# Patient Record
Sex: MALE | Race: ASIAN | Employment: FULL TIME
[De-identification: names, ages, dates, MRNs, and addresses within clinical notes are randomized per-mention and may not be internally consistent; named-entity substitution may affect disease eponyms.]

---

## 2022-12-08 ENCOUNTER — OFFICE VISIT (OUTPATIENT)
Dept: INTERNAL MEDICINE CLINIC | Facility: CLINIC | Age: 22
End: 2022-12-08
Payer: COMMERCIAL

## 2022-12-08 VITALS
SYSTOLIC BLOOD PRESSURE: 120 MMHG | HEART RATE: 76 BPM | BODY MASS INDEX: 20.61 KG/M2 | HEIGHT: 68 IN | DIASTOLIC BLOOD PRESSURE: 88 MMHG | WEIGHT: 136 LBS | OXYGEN SATURATION: 98 %

## 2022-12-08 DIAGNOSIS — M79.18 MYOFASCIAL MUSCLE PAIN: ICD-10-CM

## 2022-12-08 DIAGNOSIS — M54.9 UPPER BACK PAIN ON LEFT SIDE: ICD-10-CM

## 2022-12-08 DIAGNOSIS — Z00.00 WELLNESS EXAMINATION: Primary | ICD-10-CM

## 2022-12-08 DIAGNOSIS — Z00.00 WELLNESS EXAMINATION: ICD-10-CM

## 2022-12-08 LAB
BASOPHILS # BLD: 0.1 K/UL (ref 0–0.2)
BASOPHILS NFR BLD: 1 % (ref 0–2)
DIFFERENTIAL METHOD BLD: ABNORMAL
EOSINOPHIL # BLD: 0.1 K/UL (ref 0–0.8)
EOSINOPHIL NFR BLD: 3 % (ref 0.5–7.8)
ERYTHROCYTE [DISTWIDTH] IN BLOOD BY AUTOMATED COUNT: 12.2 % (ref 11.9–14.6)
HCT VFR BLD AUTO: 53.6 % (ref 41.1–50.3)
HGB BLD-MCNC: 17.4 G/DL (ref 13.6–17.2)
IMM GRANULOCYTES # BLD AUTO: 0 K/UL (ref 0–0.5)
IMM GRANULOCYTES NFR BLD AUTO: 0 % (ref 0–5)
LYMPHOCYTES # BLD: 1.8 K/UL (ref 0.5–4.6)
LYMPHOCYTES NFR BLD: 40 % (ref 13–44)
MCH RBC QN AUTO: 30.9 PG (ref 26.1–32.9)
MCHC RBC AUTO-ENTMCNC: 32.5 G/DL (ref 31.4–35)
MCV RBC AUTO: 95.2 FL (ref 82–102)
MONOCYTES # BLD: 0.5 K/UL (ref 0.1–1.3)
MONOCYTES NFR BLD: 11 % (ref 4–12)
NEUTS SEG # BLD: 2 K/UL (ref 1.7–8.2)
NEUTS SEG NFR BLD: 45 % (ref 43–78)
NRBC # BLD: 0 K/UL (ref 0–0.2)
PLATELET # BLD AUTO: 230 K/UL (ref 150–450)
PMV BLD AUTO: 9.4 FL (ref 9.4–12.3)
RBC # BLD AUTO: 5.63 M/UL (ref 4.23–5.6)
WBC # BLD AUTO: 4.5 K/UL (ref 4.3–11.1)

## 2022-12-08 PROCEDURE — 99385 PREV VISIT NEW AGE 18-39: CPT | Performed by: STUDENT IN AN ORGANIZED HEALTH CARE EDUCATION/TRAINING PROGRAM

## 2022-12-08 SDOH — HEALTH STABILITY: PHYSICAL HEALTH: ON AVERAGE, HOW MANY MINUTES DO YOU ENGAGE IN EXERCISE AT THIS LEVEL?: 60 MIN

## 2022-12-08 SDOH — HEALTH STABILITY: PHYSICAL HEALTH: ON AVERAGE, HOW MANY DAYS PER WEEK DO YOU ENGAGE IN MODERATE TO STRENUOUS EXERCISE (LIKE A BRISK WALK)?: 5 DAYS

## 2022-12-08 ASSESSMENT — ENCOUNTER SYMPTOMS
SHORTNESS OF BREATH: 0
VOMITING: 0
NAUSEA: 0
ABDOMINAL PAIN: 0

## 2022-12-08 NOTE — PROGRESS NOTES
SUBJECTIVE:   Kelly Darden is a 23 y.o. male seen for a visit regarding   Chief Complaint   Patient presents with    New Patient    Back Pain     Started a few weeks ago, there is muscle tightness and soreness on left side        HPI: , lives alone. Has not seen doctor. Back/shoulder pain: on left side, present for a few weeks, no injury, woke up with the pain one day. He lifts weights for exercise. Asthma: started at age 1 up to age 13, since then no issues with breathing. Left upper back pain. Lifts weights. He endorses history of self harm with skin excoriations on the left arm. Denies suicidal, homicidal ideation. Denies feeling down, depressed. Healthcare maintenance: Had COVID vaccines. Declines flu shot. TDaP 5 years ago. Past Medical History, Past Surgical History, Family history, Social History, and Medications were all reviewed with the patient today and updated as necessary. There is no problem list on file for this patient. History reviewed. No pertinent surgical history.   Family History   Problem Relation Age of Onset    Cancer Maternal Grandfather     Stroke Maternal Grandfather     Cancer Maternal Grandmother     Diabetes Maternal Grandmother     Mental Illness Maternal Grandmother         Dementia    Diabetes Paternal Grandfather     Heart Attack Paternal Grandfather     Mental Illness Paternal Grandfather         Parkinson's and Dementia    Diabetes Paternal Grandmother      Social History     Socioeconomic History    Marital status: Single     Spouse name: Not on file    Number of children: Not on file    Years of education: Not on file    Highest education level: Not on file   Occupational History    Not on file   Tobacco Use    Smoking status: Never    Smokeless tobacco: Never   Substance and Sexual Activity    Alcohol use: Never    Drug use: Never    Sexual activity: Never   Other Topics Concern    Not on file   Social History Narrative    Not on file Social Determinants of Health     Financial Resource Strain: Not on file   Food Insecurity: Not on file   Transportation Needs: Not on file   Physical Activity: Sufficiently Active    Days of Exercise per Week: 5 days    Minutes of Exercise per Session: 60 min   Stress: Not on file   Social Connections: Not on file   Intimate Partner Violence: Not At Risk    Fear of Current or Ex-Partner: No    Emotionally Abused: No    Physically Abused: No    Sexually Abused: No   Housing Stability: Not on file     No current outpatient medications on file. No current facility-administered medications for this visit. Allergies as of 12/08/2022    (No Known Allergies)         Review of Systems   Constitutional:  Negative for chills and fever. HENT:  Negative for congestion. Eyes:  Negative for visual disturbance. Respiratory:  Negative for shortness of breath. Cardiovascular:  Negative for chest pain. Gastrointestinal:  Negative for abdominal pain, nausea and vomiting. Musculoskeletal:  Negative for arthralgias. Neurological:  Negative for syncope and headaches. OBJECTIVE:    Vitals:    12/08/22 1023   BP: 120/88   Site: Right Upper Arm   Position: Sitting   Pulse: 76   SpO2: 98%   Weight: 136 lb (61.7 kg)   Height: 5' 8\" (1.727 m)        Physical Exam  Constitutional:       General: He is not in acute distress. HENT:      Head: Normocephalic and atraumatic. Right Ear: Tympanic membrane, ear canal and external ear normal.      Left Ear: Tympanic membrane, ear canal and external ear normal.      Nose: Nose normal.      Mouth/Throat:      Mouth: Mucous membranes are moist.      Pharynx: Oropharynx is clear. No oropharyngeal exudate. Eyes:      Extraocular Movements: Extraocular movements intact. Conjunctiva/sclera: Conjunctivae normal.      Pupils: Pupils are equal, round, and reactive to light. Neck:      Vascular: No carotid bruit.    Cardiovascular:      Rate and Rhythm: Normal rate and regular rhythm. Pulses: Normal pulses. Heart sounds: Normal heart sounds. Pulmonary:      Effort: Pulmonary effort is normal.      Breath sounds: Normal breath sounds. No wheezing, rhonchi or rales. Abdominal:      General: Bowel sounds are normal. There is no distension. Palpations: There is no mass. Tenderness: There is no abdominal tenderness. There is no guarding. Musculoskeletal:         General: No swelling or deformity. Cervical back: Normal range of motion. Comments: Left upper back fullness noted with palpable myofascial trigger point/knot. No spinal or paraspinal tenderness to palpation. Lymphadenopathy:      Cervical: No cervical adenopathy. Skin:     General: Skin is warm and dry. Neurological:      General: No focal deficit present. Mental Status: He is alert. Mental status is at baseline. Psychiatric:         Mood and Affect: Mood normal.          Medical problems and test results were reviewed with the patient today. ASSESSMENT and PLAN     1. Upper back pain on left side  [facility] - Physical Therapy, [facility]  2. Wellness examination  -     Hemoglobin A1C; Future  -     Lipid Panel; Future  -     Comprehensive Metabolic Panel; Future  -     CBC with Auto Differential; Future  -     TSH with Reflex; Future  -     HIV 1/2 Ag/Ab, 4TH Generation,W Rflx Confirm; Future  -     Hepatitis C Antibody; Future  3. Myofascial muscle pain     Get labs. His pain is likely from a muscle strain/knot. Refer to physical therapy. Advised stretches, massage, NSAIDs or tylenol as needed. Advised to let us know if symptoms worsen.     Return in about 1 year (around 12/8/2023) for physical.     Josh Madrigal MD

## 2022-12-11 LAB
HIV 1+2 AB+HIV1 P24 AG SERPL QL IA: NONREACTIVE
HIV 1/2 RESULT COMMENT: NORMAL

## 2022-12-12 ENCOUNTER — PATIENT MESSAGE (OUTPATIENT)
Dept: INTERNAL MEDICINE CLINIC | Facility: CLINIC | Age: 22
End: 2022-12-12

## 2022-12-12 DIAGNOSIS — R74.8 ELEVATED LIVER ENZYMES: Primary | ICD-10-CM

## 2022-12-12 LAB
ALBUMIN SERPL-MCNC: 4.4 G/DL (ref 3.5–5)
ALBUMIN/GLOB SERPL: 1.5 (ref 0.4–1.6)
ALP SERPL-CCNC: 83 U/L (ref 50–136)
ALT SERPL-CCNC: 183 U/L (ref 12–65)
ANION GAP SERPL CALC-SCNC: 5 MMOL/L (ref 2–11)
AST SERPL-CCNC: 88 U/L (ref 15–37)
BILIRUB SERPL-MCNC: 0.6 MG/DL (ref 0.2–1.1)
BUN SERPL-MCNC: 17 MG/DL (ref 6–23)
CALCIUM SERPL-MCNC: 9.6 MG/DL (ref 8.3–10.4)
CHLORIDE SERPL-SCNC: 107 MMOL/L (ref 101–110)
CHOLEST SERPL-MCNC: 142 MG/DL
CO2 SERPL-SCNC: 30 MMOL/L (ref 21–32)
CREAT SERPL-MCNC: 0.8 MG/DL (ref 0.8–1.5)
EST. AVERAGE GLUCOSE BLD GHB EST-MCNC: ABNORMAL MG/DL
GLOBULIN SER CALC-MCNC: 2.9 G/DL (ref 2.8–4.5)
GLUCOSE SERPL-MCNC: 82 MG/DL (ref 65–100)
HBA1C MFR BLD: <3.8 % (ref 4.8–5.6)
HCV AB SER QL: NONREACTIVE
HDLC SERPL-MCNC: 52 MG/DL (ref 40–60)
HDLC SERPL: 2.7
LDLC SERPL CALC-MCNC: 71.4 MG/DL
POTASSIUM SERPL-SCNC: 4 MMOL/L (ref 3.5–5.1)
PROT SERPL-MCNC: 7.3 G/DL (ref 6.3–8.2)
SODIUM SERPL-SCNC: 142 MMOL/L (ref 133–143)
TRIGL SERPL-MCNC: 93 MG/DL (ref 35–150)
TSH W FREE THYROID IF ABNORMAL: 2.24 UIU/ML (ref 0.36–3.74)
VLDLC SERPL CALC-MCNC: 18.6 MG/DL (ref 6–23)

## 2022-12-14 ENCOUNTER — HOSPITAL ENCOUNTER (OUTPATIENT)
Dept: PHYSICAL THERAPY | Age: 22
Setting detail: RECURRING SERIES
Discharge: HOME | End: 2022-12-17
Payer: COMMERCIAL

## 2022-12-14 PROCEDURE — 97110 THERAPEUTIC EXERCISES: CPT

## 2022-12-14 PROCEDURE — 97161 PT EVAL LOW COMPLEX 20 MIN: CPT

## 2022-12-14 NOTE — THERAPY EVALUATION
Ori Cervantes  : 2000  Primary: [facility] [place] [facility])  Secondary:  [address],   [address] [address]  Phone: [phone]  Fax: [phone] Plan Frequency: 1-2 times a week  Plan of Care/Certification Expiration Date: 23    PT Visit Info: Total # of Visits to Date: 1    Visit Count:  1                OUTPATIENT PHYSICAL THERAPY:             OP NOTE TYPE: Initial Assessment 2022               Episode  Appt Desk         Treatment Diagnosis:  Pain in Left Shoulder (M25.512)  Stiffness of Left Shoulder, Not elsewhere classified (M25.612)  Medical/Referring Diagnosis:  Upper back pain on left side [M54.9]  Referring Physician:  Darlin Kothari MD MD Orders:  PT Eval and Treat   Return MD Appt:  23  Date of Onset:  Onset Date: 22    Allergies:  Patient has no known allergies. Restrictions/Precautions:    Restrictions/Precautions: None  Required Braces or Orthoses?: No      Medications Last Reviewed:  2022     SUBJECTIVE   History of Injury/Illness (Reason for Referral):  Patient reports that he has been feeling L upper thoracic pain since the beginning of November. Doesn't report any specific mechanism of injury and can't recall a reason as to why the pain might have initiated. He goes to his apartment gym 4-5 times a week and overall is fairly healthy individual.  Reports that whenever he is sleeping on his R side he feels discomfort on L side. Patient Stated Goal(s):  \"relieve pain and be able to stretch the affected muscles comfortably. \"  Initial:     3/10 Post Session:     2/10  Past Medical History/Comorbidities:   Mr. Shorty Khan  has a past medical history of Asthma. Mr. Shorty Khan  has no past surgical history on file.   Social History/Living Environment:   Lives With: Alone  Type of Home: Apartment  Home Layout: Two level     Prior Level of Function/Work/Activity:   Prior level of function: Independent  Prior level of function: Independent  Current level of function: Independent  Occupation: Full time employment  Type of Occupation:            Learning:   Does the patient/guardian have any barriers to learning?: No barriers  Will there be a co-learner?: No  What is the preferred language of the patient/guardian?: English  Is an  required?: No  How does the patient/guardian prefer to learn new concepts?: Listening; Reading; Demonstration; Pictures/Videos     Fall Risk Scale: Collaoz Total Score: 0  Collazo Fall Risk: Low (0-24)     Dominant Side:  right handed      OBJECTIVE   Observation/Orthostatic Postural Assessment:     Minimal rounded shoulders with increased tone on his L scapular musculature especially near the medial border of the scapulae. Presented scapular winging on L side with scaption raises. Palpation:    Tender to palpation on muscles around the medial border of L scapulae. Gait:   NA  Special tests:   NA     Joint/Muscle ROM Strength Updates   Shoulder flexion 180 deg BUE 4+/5 BUE     Shoulder abduction 180 deg BUE 4+/5 BUE     Shoulder extension 55 deg BUE 4+/5 BUE     Shoulder external rotation 80 deg BUE 4/5 LUE, 4+/5 RUE     Shoulder internal rotation 85 deg BUE 4+/5 BUE     Serratus anterior strength  4/5 LUE, 4+/5 RUE                           ASSESSMENT   Initial Assessment:  Mr. Jaime Freitas presents to PT with c/o of acute L upper thoracic pain that started around 11/1/22. Patient reports feeling pain with any LUE position that puts his scapular muscles in an elongated position. .  Pt demonstrated significant tenderness to palpation to the medial border of the L scapulae as well as scapular winging of L side during any flexion movements above 100 degrees. With testing, pt displayed decreased LUE external rotation strength and serratus anterior activation. Additionally, he scored a 4/50 on the modified Oswestry.   Mr. Jaime Freitas will benefit from continued skilled PT services to improve deficits listed below and to progress towards his PLOF. Problem List: (Impacting functional limitations): Body Structures, Functions, Activity Limitations Requiring Skilled Therapeutic Intervention: Decreased functional mobility ; Decreased ROM; Decreased body mechanics; Decreased strength; Decreased endurance; Increased pain; Decreased posture     Therapy Prognosis:   Therapy Prognosis: Excellent     Initial Assessment Complexity:   Decision Making: Low Complexity    PLAN   Effective Dates: 12/14/22 TO Plan of Care/Certification Expiration Date: 03/14/23   Frequency/Duration: Plan Frequency: 1-2 times a week   Interventions Planned (Treatment may consist of any combination of the following):    Current Treatment Recommendations: Strengthening; ROM; Neuromuscular re-education; Home exercise program     Goals: (Goals have been discussed and agreed upon with patient.)  GOALS: (Goals have been discussed and agreed upon with patient.)  Short Term Goals 4 weeks   Magda Domingo will be independent with HEP to promote self-management of symptoms. Magda Domingo will be able to sleep in position desired without an increase in pain to improve QOL. Magda Domingo will tolerate manual therapy/joint mobilizations/soft tissue to increase ROM and decrease pain to improve functional mobility during ADLs. Discharge Goals 12 weeks  Magda Domingo will demonstrate an 4 point improvement on the Oswestry to show improvement in function. Magda Domingo will demonstrate >=4+/5 LE strength on manual muscle testing of L shoulder external rotation to improve functional mobility. Magda Domingo will be demonstrate proper scapular rhythm during overhead motions to ensure proper biomechanics. Outcome Measure: Tool Used: Modified Oswestry Low Back Pain Questionnaire  Score:  Initial: 4/50  Most Recent: X/50 (Date: -- )   Interpretation of Score: Each section is scored on a 0-5 scale, 5 representing the greatest disability.   The scores of each section are added together for a total score of 50. Medical Necessity:   > Patient is expected to demonstrate progress in strength and range of motion to improve safety during working out.  > Skilled intervention continues to be required due to impairments listed above. Reason For Services/Other Comments:  > Patient continues to demonstrate capacity to improve ROM and strength of LUE which will increase independence.  > Patient continues to require skilled intervention due to impairments listed above. Total Duration:  Time In: 1600  Time Out: 1645    Regarding Olu Titi's therapy, I certify that the treatment plan above will be carried out by a therapist or under their direction. Thank you for this referral,  Ale Hall, PT     Referring Physician Signature: Max Badillo MD No Signature is Required for this note.         Post Session Pain  Charge Capture  PT Visit Info MD Tristin Kraft

## 2022-12-15 ASSESSMENT — PAIN SCALES - GENERAL: PAINLEVEL_OUTOF10: 3

## 2022-12-19 ENCOUNTER — HOSPITAL ENCOUNTER (OUTPATIENT)
Dept: PHYSICAL THERAPY | Age: 22
Setting detail: RECURRING SERIES
Discharge: HOME | End: 2022-12-22
Payer: COMMERCIAL

## 2022-12-19 PROCEDURE — 97140 MANUAL THERAPY 1/> REGIONS: CPT

## 2022-12-19 PROCEDURE — 97110 THERAPEUTIC EXERCISES: CPT

## 2022-12-19 NOTE — PROGRESS NOTES
David Meadows  : 2000  Primary: Gall[facility] [address] [facility])  Secondary:  [facility]  [address]   [address]  [address] [place] [address] Way [address]  Phone: [phone]  Fax: [phone] Plan Frequency: 1-2 times a week    Plan of Care/Certification Expiration Date: 23      PT Visit Info:  Plan Frequency: 1-2 times a week  Plan of Care/Certification Expiration Date: 23  Total # of Visits to Date: 2     Visit Count:  2   OUTPATIENT PHYSICAL THERAPY:OP NOTE TYPE: Treatment Note 2022       Episode  }Appt Desk             Treatment Diagnosis:  Pain in Left Shoulder (M25.512)  Stiffness of Left Shoulder, Not elsewhere classified (M25.612)  Medical/Referring Diagnosis:  Upper back pain on left side [M54.9]  Referring Physician:  Adina Wilson MD MD Orders:  PT Eval and Treat   Date of Onset:  Onset Date: 22     Allergies:   Patient has no known allergies. Restrictions/Precautions:  Restrictions/Precautions: None  Required Braces or Orthoses?: No  No data recorded     Interventions Planned (Treatment may consist of any combination of the following):    Current Treatment Recommendations: Strengthening; ROM; Neuromuscular re-education; Home exercise program     Subjective Comments:  patient reports that his pain has gotten significantly better, reports that he occasionally feels increased tightness when taking deep breaths. Initial:}    1/10Post Session:       1/10  Medications Last Reviewed:  2022  Updated Objective Findings:  None Today  Treatment   THERAPEUTIC EXERCISE: (40 minutes):    Exercises per grid below to improve mobility and strength. Required moderate visual, verbal, manual, and tactile cues to promote proper body alignment, promote proper body posture, and promote proper body mechanics. Progressed resistance, range, and repetitions as indicated.      Date:   Date:   Date:     Activity/Exercise Parameters Parameters Parameters   Upper thoracic rotation In side-lying 4 x 10 sec holds  In quadruped 4 x 20 sec w/ therapist overpressure     Prone I's  W/ 3 lbs DB 2 x 10      Prone Y's  2 x 10      Rhomboid stretch W/ doorframe 2 x 30 sec      Serratus anterior strengthening W/ YTB 2 x 10 performing shoulder flexion w/ constant ER tension  Foam roller slides on wall 2 x 10      Scapular strengthening W/ 7 lbs cable 2 x 10 low rows      Cat/camel  2 x 8        MANUAL THERAPY: (15 minutes):   Joint mobilization, Soft tissue mobilization, and Manipulation was utilized and necessary because of the patient's restricted joint motion, painful spasm, loss of articular motion, and restricted motion of soft tissue. STM/MFR to L scapular musculature tool-assisted in prone  Grade III-IV PA mobilization of T3-T8 in prone     Treatment/Session Summary:    Treatment Assessment:  did well demonstrating less tone/tightness in L scapular musculature. Communication/Consultation:  None today  Equipment provided today:  None  Recommendations/Intent for next treatment session: Next visit will focus on improving scapular strength as well as serratus anterior control.     Total Treatment Billable Duration:  40 therex minutes, 15 manual therapy minutes  Time In: 3749  Time Out: 4039 [facility], PT       Charge Capture  }Post Session Pain  PT Visit Info  [address] [facility] Portal  MD Guidelines  Scanned Media  Benefits  MyChart    Future Appointments   Date Time Provider Ervin Gibbs   12/21/2022  3:15 PM Darshan Patel PT [facility]   12/11/2023  9:00 AM Cecy Olsen MD [facility]

## 2022-12-21 ENCOUNTER — HOSPITAL ENCOUNTER (OUTPATIENT)
Dept: PHYSICAL THERAPY | Age: 22
Setting detail: RECURRING SERIES
Discharge: HOME | End: 2022-12-24
Payer: COMMERCIAL

## 2022-12-21 PROCEDURE — 97140 MANUAL THERAPY 1/> REGIONS: CPT

## 2022-12-21 PROCEDURE — 97110 THERAPEUTIC EXERCISES: CPT

## 2022-12-21 ASSESSMENT — PAIN SCALES - GENERAL: PAINLEVEL_OUTOF10: 1

## 2022-12-21 NOTE — PROGRESS NOTES
Adwoa Smalls  : 2000  Primary: [facility])  Secondary:  [facility]  [address]  [address]  [facility] [address]  Phone: [phone]  Fax: [phone] Plan Frequency: 1-2 times a week    Plan of Care/Certification Expiration Date: 23      PT Visit Info:  Plan Frequency: 1-2 times a week  Plan of Care/Certification Expiration Date: 23  Total # of Visits to Date: 3     Visit Count:  3   OUTPATIENT PHYSICAL THERAPY:OP NOTE TYPE: Treatment Note 2022       Episode  }Appt Desk             Treatment Diagnosis:  Pain in Left Shoulder (M25.512)  Stiffness of Left Shoulder, Not elsewhere classified (M25.612)  Medical/Referring Diagnosis:  Upper back pain on left side [M54.9]  Referring Physician:  Debbie Wang MD MD Orders:  PT Eval and Treat   Date of Onset:  Onset Date: 22     Allergies:   Patient has no known allergies. Restrictions/Precautions:  Restrictions/Precautions: None  Required Braces or Orthoses?: No  No data recorded     Interventions Planned (Treatment may consist of any combination of the following):    Current Treatment Recommendations: Strengthening; ROM; Neuromuscular re-education; Home exercise program     Subjective Comments:  patient reports that his pain has become a lot more localized in the past week. Initial:}    1/10Post Session:       1/10  Medications Last Reviewed:  2022  Updated Objective Findings:  None Today  Treatment   THERAPEUTIC EXERCISE: (40 minutes):    Exercises per grid below to improve mobility and strength. Required moderate visual, verbal, manual, and tactile cues to promote proper body alignment, promote proper body posture, and promote proper body mechanics. Progressed resistance, range, and repetitions as indicated.      Date:   Date:   Date:     Activity/Exercise Parameters Parameters Parameters   Upper thoracic rotation In side-lying 4 x 10 sec holds  In quadruped 4 x 20 sec w/ therapist overpressure In quadruped 4 x 20 sec w/ therapist overpressure  In side-lying 4 x 10 sec holds    Prone I's  W/ 3 lbs DB 2 x 10  W/ 3 lbs DB 2 x 10     Prone Y's  2 x 10      Rhomboid stretch W/ doorframe 2 x 30 sec  W/ doorframe 2 x 30 sec     Serratus anterior strengthening W/ YTB 2 x 10 performing shoulder flexion w/ constant ER tension  Foam roller slides on wall 2 x 10  Foam roller slides on wall 2 x 10   Serratus anterior punches w/ cables 7lbs 2 x 10     Scapular strengthening W/ 7 lbs cable 2 x 10 low rows  W/ 10 lbs cable x 10 low rows, w/ 13 lbs x 10    Cat/camel  2 x 8  2 x 8       MANUAL THERAPY: (15 minutes):   Joint mobilization, Soft tissue mobilization, and Manipulation was utilized and necessary because of the patient's restricted joint motion, painful spasm, loss of articular motion, and restricted motion of soft tissue. STM/MFR to L scapular musculature tool-assisted in prone  Grade III-IV PA mobilization of T3-T8 in prone     Treatment/Session Summary:    Treatment Assessment:  mr. christianson tolerated session well, demonstrated adequate scapular control after cuing. Communication/Consultation:  None today  Equipment provided today:  None  Recommendations/Intent for next treatment session: Next visit will focus on improving scapular strength as well as serratus anterior control.     Total Treatment Billable Duration:  40 therex minutes, 15 manual therapy minutes  Time In: 8037  Time Out: 2401 [facility], PT       Charge Capture  }Post Session Pain  PT Visit Info  [address] [facility] Portal  MD Guidelines  Scanned Media  Benefits  MyChart    Future Appointments   Date Time Provider Ervin Gibbs   12/29/2022  4:45 PM Leonard Barbosa PT [facility]   1/5/2023  3:15 PM Leonard Barbosa PT [facility]   12/11/2023  9:00 AM Nnamdi Youngblood MD [facility]

## 2022-12-22 ASSESSMENT — PAIN SCALES - GENERAL: PAINLEVEL_OUTOF10: 1

## 2022-12-29 ENCOUNTER — HOSPITAL ENCOUNTER (OUTPATIENT)
Dept: PHYSICAL THERAPY | Age: 22
Setting detail: RECURRING SERIES
End: 2022-12-29
Payer: COMMERCIAL

## 2022-12-29 PROCEDURE — 97110 THERAPEUTIC EXERCISES: CPT

## 2022-12-29 NOTE — PROGRESS NOTES
Benjamin Gonzalez  : 2000  Primary: Igor [facility] [facility] ([address])  Secondary:  [facility]  [address] Meena Perez [address] [place], [place] [address] [address]  Phone: [phone]  Fax: [phone] Plan Frequency: 1-2 times a week    Plan of Care/Certification Expiration Date: 23      PT Visit Info:  Plan Frequency: 1-2 times a week  Plan of Care/Certification Expiration Date: 23  Total # of Visits to Date: 4     Visit Count:  4   OUTPATIENT PHYSICAL THERAPY:OP NOTE TYPE: Treatment Note 2022       Episode  }Appt Desk             Treatment Diagnosis:  Pain in Left Shoulder (M25.512)  Stiffness of Left Shoulder, Not elsewhere classified (M25.612)  Medical/Referring Diagnosis:  Upper back pain on left side [M54.9]  Referring Physician:  Magdiel Bonilla MD MD Orders:  PT Eval and Treat   Date of Onset:  Onset Date: 22     Allergies:   Patient has no known allergies. Restrictions/Precautions:  Restrictions/Precautions: None  Required Braces or Orthoses?: No  No data recorded     Interventions Planned (Treatment may consist of any combination of the following):    Current Treatment Recommendations: Strengthening; ROM; Neuromuscular re-education; Home exercise program     Subjective Comments:  reports that the pain has pretty much gone away however still reports occasional tightness in L scapular musculature. Initial:}    0/10Post Session:       0/10  Medications Last Reviewed:  2022  Updated Objective Findings:  None Today  Treatment   THERAPEUTIC EXERCISE: (40 minutes):    Exercises per grid below to improve mobility and strength. Required moderate visual, verbal, manual, and tactile cues to promote proper body alignment, promote proper body posture, and promote proper body mechanics. Progressed resistance, range, and repetitions as indicated.      Date:   Date:   Date:     Activity/Exercise Parameters Parameters Parameters   Upper thoracic rotation In side-lying 4 x 10 sec holds  In quadruped 4 x 20 sec w/ therapist overpressure In quadruped 4 x 20 sec w/ therapist overpressure  In side-lying 4 x 10 sec holds In quadruped 4 x 20 sec w/ therapist overpressure   Prone I's  W/ 3 lbs DB 2 x 10  W/ 3 lbs DB 2 x 10     Prone Y's  2 x 10      Rhomboid stretch W/ doorframe 2 x 30 sec  W/ doorframe 2 x 30 sec  W/ doorframe 2 x 30 sec    Serratus anterior strengthening W/ YTB 2 x 10 performing shoulder flexion w/ constant ER tension  Foam roller slides on wall 2 x 10  Foam roller slides on wall 2 x 10   Serratus anterior punches w/ cables 7lbs 2 x 10  Serratus anterior punches w/ manual resistance throughout full shoulder flexion x 8    Scapular strengthening W/ 7 lbs cable 2 x 10 low rows  W/ 10 lbs cable x 10 low rows, w/ 13 lbs x 10 W/ 10 lbs cable x 10 low rows, w/ 13 lbs x 10   Cat/camel  2 x 8  2 x 8     Shoulder ER strengthening   In side-lying w/ 4lbs 2 x 10   90/90 ER w/ red band 2 x 10      Lat pulldowns    W/ 7 lbs cable 2 x 10 half-kneeling     MANUAL THERAPY: (15 minutes):   Joint mobilization, Soft tissue mobilization, and Manipulation was utilized and necessary because of the patient's restricted joint motion, painful spasm, loss of articular motion, and restricted motion of soft tissue. STM/MFR to L scapular musculature tool-assisted in prone  Grade III-IV PA mobilization of T3-T8 in prone     Treatment/Session Summary:    Treatment Assessment:  mr. christianson did well with session, required cuing towards beginning of session to stabilize L shoulder blade adequately during exercises however towards end of session patient improved with his overall awareness with stabilizing shoulder blade. Communication/Consultation:  None today  Equipment provided today:  None  Recommendations/Intent for next treatment session: Next visit will focus on improving scapular strength as well as serratus anterior control.     Total Treatment Billable Duration:  40 therex minutes, 15 manual therapy minutes  Time In: 1645  Time Out: 4039 [facility], PT       Charge Capture  }Post Session Pain  PT Visit [address] Portal  MD Guidelines  Scanned Media  Benefits  MyChart    Future Appointments   Date Time Provider Ervin Gibbs   1/5/2023  3:15 PM Isabella Galeas, PT [facility]   12/11/2023  9:00 AM Slim Fajardo, MD [facility]

## 2022-12-30 ASSESSMENT — PAIN SCALES - GENERAL: PAINLEVEL_OUTOF10: 0

## 2023-01-05 ENCOUNTER — HOSPITAL ENCOUNTER (OUTPATIENT)
Dept: PHYSICAL THERAPY | Age: 23
Setting detail: RECURRING SERIES
Discharge: HOME | End: 2023-01-08
Payer: COMMERCIAL

## 2023-01-05 PROCEDURE — 97110 THERAPEUTIC EXERCISES: CPT

## 2023-01-05 NOTE — PROGRESS NOTES
Yvette Del Castillo  : 2000  Primary: Lo Gallo Sc ([address] [facility])  Secondary:  O MILLENNIUM  [address]  [address] [facility] [address]  Phone: [phone]  Fax: [phone] Plan Frequency: 1-2 times a week    Plan of Care/Certification Expiration Date: 23      PT Visit Info:  Plan Frequency: 1-2 times a week  Plan of Care/Certification Expiration Date: 23  Total # of Visits to Date: 5     Visit Count:  5   OUTPATIENT PHYSICAL THERAPY:OP NOTE TYPE: Treatment Note 2023       Episode  }Appt Desk             Treatment Diagnosis:  Pain in Left Shoulder (M25.512)  Stiffness of Left Shoulder, Not elsewhere classified (M25.612)  Medical/Referring Diagnosis:  Upper back pain on left side [M54.9]  Referring Physician:  Zeina Jorge MD MD Orders:  PT Eval and Treat   Date of Onset:  Onset Date: 22     Allergies:   Patient has no known allergies. Restrictions/Precautions:  Restrictions/Precautions: None  Required Braces or Orthoses?: No  No data recorded     Interventions Planned (Treatment may consist of any combination of the following):    Current Treatment Recommendations: Strengthening; ROM; Neuromuscular re-education; Home exercise program     Subjective Comments:  pt reports that he hasn't really had anymore pain but that his L lat has had increased tightness recently. Initial:}    0/10Post Session:       0/10  Medications Last Reviewed:  2023  Updated Objective Findings:  None Today  Treatment   THERAPEUTIC EXERCISE: (40 minutes):    Exercises per grid below to improve mobility and strength. Required moderate visual, verbal, manual, and tactile cues to promote proper body alignment, promote proper body posture, and promote proper body mechanics. Progressed resistance, range, and repetitions as indicated.      Date:   Date:   Date:   Date:     Activity/Exercise Parameters Parameters Parameters Parameters   Upper thoracic rotation In side-lying 4 x 10 sec holds  In quadruped 4 x 20 sec w/ therapist overpressure In quadruped 4 x 20 sec w/ therapist overpressure  In side-lying 4 x 10 sec holds In quadruped 4 x 20 sec w/ therapist overpressure In quadruped 4 x 20 sec w/ therapist overpressure   Prone I's  W/ 3 lbs DB 2 x 10  W/ 3 lbs DB 2 x 10      Prone Y's  2 x 10       Rhomboid stretch W/ doorframe 2 x 30 sec  W/ doorframe 2 x 30 sec  W/ doorframe 2 x 30 sec     Serratus anterior strengthening W/ YTB 2 x 10 performing shoulder flexion w/ constant ER tension  Foam roller slides on wall 2 x 10  Foam roller slides on wall 2 x 10   Serratus anterior punches w/ cables 7lbs 2 x 10  Serratus anterior punches w/ manual resistance throughout full shoulder flexion x 8  Serratus anterior punches w/ manual resistance throughout full shoulder flexion x 8  Supine punches w/ 4lbs 2 x 10    Scapular strengthening W/ 7 lbs cable 2 x 10 low rows  W/ 10 lbs cable x 10 low rows, w/ 13 lbs x 10 W/ 10 lbs cable x 10 low rows, w/ 13 lbs x 10    Cat/camel  2 x 8  2 x 8      Shoulder ER strengthening   In side-lying w/ 4lbs 2 x 10   90/90 ER w/ red band 2 x 10    In side-lying w/ 4lbs 2 x 10   90/90 ER w/ red band 2 x 10    Lat pulldowns    W/ 7 lbs cable 2 x 10 half-kneeling W/ 20 lbs cable 2 x 10 half-kneeling     MANUAL THERAPY: (00 minutes):   Joint mobilization, Soft tissue mobilization, and Manipulation was utilized and necessary because of the patient's restricted joint motion, painful spasm, loss of articular motion, and restricted motion of soft tissue. STM/MFR to L scapular musculature tool-assisted in prone  Grade III-IV PA mobilization of T3-T8 in prone     Treatment/Session Summary:    Treatment Assessment:  mr. christianson continues to show great body awareness with scapular stabilization, had great technique while performing lat pulldowns with increased weight.   Discussed with patient about starting his workout routine back again and slowly increasing difficulty of pull ups.  Communication/Consultation:  None today  Equipment provided today:  None  Recommendations/Intent for next treatment session: Next visit will focus on improving scapular strength as well as serratus anterior control.     Total Treatment Billable Duration:  45 therex minutes, 00 manual therapy minutes  Time In: 7938  Time Out: [address], PT       Charge Capture  }Post Session Pain  PT Visit [address] [facility] Portal  MD Guidelines  Scanned Media  Benefits  MyChart    Future Appointments   Date Time Provider [place]   1/25/2023  4:45 PM Godwin Mchugh PT [facility]   12/11/2023  9:00 AM Mini Hebert MD [facility]

## 2023-01-06 ASSESSMENT — PAIN SCALES - GENERAL: PAINLEVEL_OUTOF10: 0

## 2023-01-25 ENCOUNTER — APPOINTMENT (OUTPATIENT)
Dept: PHYSICAL THERAPY | Age: 23
End: 2023-01-25
Payer: COMMERCIAL

## 2023-12-10 ASSESSMENT — PATIENT HEALTH QUESTIONNAIRE - PHQ9
SUM OF ALL RESPONSES TO PHQ QUESTIONS 1-9: 0
SUM OF ALL RESPONSES TO PHQ9 QUESTIONS 1 & 2: 0
1. LITTLE INTEREST OR PLEASURE IN DOING THINGS: 0
SUM OF ALL RESPONSES TO PHQ QUESTIONS 1-9: 0
SUM OF ALL RESPONSES TO PHQ9 QUESTIONS 1 & 2: 0
2. FEELING DOWN, DEPRESSED OR HOPELESS: NOT AT ALL
2. FEELING DOWN, DEPRESSED OR HOPELESS: 0
1. LITTLE INTEREST OR PLEASURE IN DOING THINGS: NOT AT ALL

## 2023-12-11 ENCOUNTER — OFFICE VISIT (OUTPATIENT)
Dept: INTERNAL MEDICINE CLINIC | Facility: CLINIC | Age: 23
End: 2023-12-11
Payer: COMMERCIAL

## 2023-12-11 VITALS
BODY MASS INDEX: 22.73 KG/M2 | DIASTOLIC BLOOD PRESSURE: 68 MMHG | HEIGHT: 68 IN | SYSTOLIC BLOOD PRESSURE: 110 MMHG | OXYGEN SATURATION: 99 % | HEART RATE: 67 BPM | WEIGHT: 150 LBS

## 2023-12-11 DIAGNOSIS — Z00.00 PHYSICAL EXAM: Primary | ICD-10-CM

## 2023-12-11 DIAGNOSIS — Z00.00 PHYSICAL EXAM: ICD-10-CM

## 2023-12-11 DIAGNOSIS — R74.01 TRANSAMINITIS: ICD-10-CM

## 2023-12-11 DIAGNOSIS — E16.2 HYPOGLYCEMIA: ICD-10-CM

## 2023-12-11 DIAGNOSIS — R74.8 ELEVATED LIVER ENZYMES: ICD-10-CM

## 2023-12-11 PROBLEM — M54.9 UPPER BACK PAIN ON LEFT SIDE: Status: RESOLVED | Noted: 2022-12-08 | Resolved: 2023-12-11

## 2023-12-11 LAB
ALBUMIN SERPL-MCNC: 4.2 G/DL (ref 3.5–5)
ALBUMIN SERPL-MCNC: 4.5 G/DL (ref 3.5–5)
ALBUMIN/GLOB SERPL: 1.2 (ref 0.4–1.6)
ALBUMIN/GLOB SERPL: 1.5 (ref 0.4–1.6)
ALP SERPL-CCNC: 81 U/L (ref 50–136)
ALP SERPL-CCNC: 82 U/L (ref 50–136)
ALT SERPL-CCNC: 61 U/L (ref 12–65)
ALT SERPL-CCNC: 63 U/L (ref 12–65)
ANION GAP SERPL CALC-SCNC: 5 MMOL/L (ref 2–11)
AST SERPL-CCNC: 19 U/L (ref 15–37)
AST SERPL-CCNC: 21 U/L (ref 15–37)
BASOPHILS # BLD: 0.1 K/UL (ref 0–0.2)
BASOPHILS NFR BLD: 1 % (ref 0–2)
BILIRUB DIRECT SERPL-MCNC: 0.3 MG/DL
BILIRUB SERPL-MCNC: 1.1 MG/DL (ref 0.2–1.1)
BILIRUB SERPL-MCNC: 1.1 MG/DL (ref 0.2–1.1)
BUN SERPL-MCNC: 15 MG/DL (ref 6–23)
CALCIUM SERPL-MCNC: 9.4 MG/DL (ref 8.3–10.4)
CHLORIDE SERPL-SCNC: 107 MMOL/L (ref 103–113)
CO2 SERPL-SCNC: 27 MMOL/L (ref 21–32)
CREAT SERPL-MCNC: 1 MG/DL (ref 0.8–1.5)
DIFFERENTIAL METHOD BLD: ABNORMAL
EOSINOPHIL # BLD: 0.2 K/UL (ref 0–0.8)
EOSINOPHIL NFR BLD: 3 % (ref 0.5–7.8)
ERYTHROCYTE [DISTWIDTH] IN BLOOD BY AUTOMATED COUNT: 11.9 % (ref 11.9–14.6)
FERRITIN SERPL-MCNC: 116 NG/ML (ref 8–388)
GLOBULIN SER CALC-MCNC: 3.1 G/DL (ref 2.8–4.5)
GLOBULIN SER CALC-MCNC: 3.4 G/DL (ref 2.8–4.5)
GLUCOSE SERPL-MCNC: 86 MG/DL (ref 65–100)
HBV SURFACE AB SERPL IA-ACNC: >1000 MIU/ML
HBV SURFACE AG SER QL: NONREACTIVE
HGB BLD-MCNC: 18.4 G/DL (ref 13.6–17.2)
IMM GRANULOCYTES # BLD AUTO: 0 K/UL (ref 0–0.5)
IMM GRANULOCYTES NFR BLD AUTO: 0 % (ref 0–5)
IRON SATN MFR SERPL: 42 %
IRON SERPL-MCNC: 140 UG/DL (ref 35–150)
LYMPHOCYTES # BLD: 1.9 K/UL (ref 0.5–4.6)
LYMPHOCYTES NFR BLD: 37 % (ref 13–44)
MCH RBC QN AUTO: 31.1 PG (ref 26.1–32.9)
MCHC RBC AUTO-ENTMCNC: 33.3 G/DL (ref 31.4–35)
MCV RBC AUTO: 93.2 FL (ref 82–102)
MONOCYTES # BLD: 0.5 K/UL (ref 0.1–1.3)
MONOCYTES NFR BLD: 9 % (ref 4–12)
NEUTS SEG # BLD: 2.7 K/UL (ref 1.7–8.2)
NEUTS SEG NFR BLD: 50 % (ref 43–78)
NRBC # BLD: 0 K/UL (ref 0–0.2)
PLATELET # BLD AUTO: 201 K/UL (ref 150–450)
PMV BLD AUTO: 9 FL (ref 9.4–12.3)
POTASSIUM SERPL-SCNC: 3.8 MMOL/L (ref 3.5–5.1)
PROT SERPL-MCNC: 7.6 G/DL (ref 6.3–8.2)
PROT SERPL-MCNC: 7.6 G/DL (ref 6.3–8.2)
RBC # BLD AUTO: 5.92 M/UL (ref 4.23–5.6)
SODIUM SERPL-SCNC: 139 MMOL/L (ref 136–146)
T4 FREE SERPL-MCNC: 1.2 NG/DL (ref 0.78–1.46)
TIBC SERPL-MCNC: 333 UG/DL (ref 250–450)
TSH W FREE THYROID IF ABNORMAL: 4.32 UIU/ML (ref 0.36–3.74)
WBC # BLD AUTO: 5.3 K/UL (ref 4.3–11.1)

## 2023-12-11 PROCEDURE — 99395 PREV VISIT EST AGE 18-39: CPT | Performed by: STUDENT IN AN ORGANIZED HEALTH CARE EDUCATION/TRAINING PROGRAM

## 2023-12-11 SDOH — ECONOMIC STABILITY: FOOD INSECURITY: WITHIN THE PAST 12 MONTHS, THE FOOD YOU BOUGHT JUST DIDN'T LAST AND YOU DIDN'T HAVE MONEY TO GET MORE.: NEVER TRUE

## 2023-12-11 SDOH — ECONOMIC STABILITY: HOUSING INSECURITY
IN THE LAST 12 MONTHS, WAS THERE A TIME WHEN YOU DID NOT HAVE A STEADY PLACE TO SLEEP OR SLEPT IN A SHELTER (INCLUDING NOW)?: NO

## 2023-12-11 SDOH — ECONOMIC STABILITY: FOOD INSECURITY: WITHIN THE PAST 12 MONTHS, YOU WORRIED THAT YOUR FOOD WOULD RUN OUT BEFORE YOU GOT MONEY TO BUY MORE.: NEVER TRUE

## 2023-12-11 SDOH — ECONOMIC STABILITY: INCOME INSECURITY: HOW HARD IS IT FOR YOU TO PAY FOR THE VERY BASICS LIKE FOOD, HOUSING, MEDICAL CARE, AND HEATING?: NOT HARD AT ALL

## 2023-12-11 NOTE — PROGRESS NOTES
FOLLOW UP VISIT    Subjective:    Gee Luna (: 2000) is a 20 y.o., male,   Chief Complaint   Patient presents with    Annual Exam       HPI:    Elevated liver enzymes: Labs done 22 showed AST 88, . He doesn't drink, no supplements, no Fhx liver problems. He didn't get repeat labs done. Doesn't smoke, no drugs. A1c was <3.8, I suspect this was lab error. Back, shoulder pain has resolved. Denies issues with depression. HCM: exercises most days of the week. Declines flu shot today. The following portions of the patient's history were reviewed and updated as appropriate:      Patient Active Problem List    Diagnosis Date Noted    Transaminitis 2023      Past Medical History:   Diagnosis Date    Asthma     childhood only, and no more problems      History reviewed. No pertinent surgical history. Family History   Problem Relation Age of Onset    Cancer Maternal Grandfather     Stroke Maternal Grandfather     Cancer Maternal Grandmother     Diabetes Maternal Grandmother     Mental Illness Maternal Grandmother         Dementia    Diabetes Paternal Grandfather     Heart Attack Paternal Grandfather     Mental Illness Paternal Grandfather         Parkinson's and Dementia    Diabetes Paternal Grandmother      Social History     Tobacco Use   Smoking Status Never   Smokeless Tobacco Never      Social History     Substance and Sexual Activity   Alcohol Use Never      Social History     Substance and Sexual Activity   Drug Use Never      No current outpatient medications on file. No current facility-administered medications for this visit. Allergies as of 2023    (No Known Allergies)       Review of Systems    Objective:    Vitals:    23 0900   BP: 110/68   Site: Left Upper Arm   Position: Sitting   Pulse: 67   SpO2: 99%   Weight: 68 kg (150 lb)   Height: 1.727 m (5' 8\")        Physical Exam  Constitutional:       General: He is not in acute distress.   HENT:      Head:

## 2023-12-12 DIAGNOSIS — D75.1 POLYCYTHEMIA: Primary | ICD-10-CM

## 2023-12-12 DIAGNOSIS — D75.1 ERYTHROCYTOSIS: ICD-10-CM

## 2023-12-12 LAB
EST. AVERAGE GLUCOSE BLD GHB EST-MCNC: ABNORMAL MG/DL
HBA1C MFR BLD: <3.8 % (ref 4.8–5.6)

## 2023-12-15 DIAGNOSIS — D75.1 ERYTHROCYTOSIS: Primary | ICD-10-CM

## 2023-12-15 DIAGNOSIS — D75.1 POLYCYTHEMIA: ICD-10-CM

## 2023-12-15 DIAGNOSIS — E03.8 SUBCLINICAL HYPOTHYROIDISM: ICD-10-CM

## 2023-12-15 LAB — EPO SERPL-ACNC: 5.9 MIU/ML (ref 2.6–18.5)

## 2024-03-21 DIAGNOSIS — D75.1 ERYTHROCYTOSIS: ICD-10-CM

## 2024-03-21 DIAGNOSIS — R74.01 TRANSAMINITIS: ICD-10-CM

## 2024-03-21 DIAGNOSIS — Z00.00 PHYSICAL EXAM: ICD-10-CM

## 2024-03-21 DIAGNOSIS — D75.1 POLYCYTHEMIA: ICD-10-CM

## 2024-03-21 DIAGNOSIS — E03.8 SUBCLINICAL HYPOTHYROIDISM: ICD-10-CM

## 2024-03-21 DIAGNOSIS — E03.8 SUBCLINICAL HYPOTHYROIDISM: Primary | ICD-10-CM

## 2024-03-21 LAB
BASOPHILS # BLD: 0 K/UL (ref 0–0.2)
BASOPHILS NFR BLD: 1 % (ref 0–2)
DIFFERENTIAL METHOD BLD: ABNORMAL
EOSINOPHIL # BLD: 0.1 K/UL (ref 0–0.8)
EOSINOPHIL NFR BLD: 2 % (ref 0.5–7.8)
ERYTHROCYTE [DISTWIDTH] IN BLOOD BY AUTOMATED COUNT: 12.1 % (ref 11.9–14.6)
HCT VFR BLD AUTO: 51.3 % (ref 41.1–50.3)
HGB BLD-MCNC: 17.1 G/DL (ref 13.6–17.2)
IMM GRANULOCYTES # BLD AUTO: 0 K/UL (ref 0–0.5)
IMM GRANULOCYTES NFR BLD AUTO: 0 % (ref 0–5)
LYMPHOCYTES # BLD: 1.8 K/UL (ref 0.5–4.6)
LYMPHOCYTES NFR BLD: 40 % (ref 13–44)
MCH RBC QN AUTO: 30.4 PG (ref 26.1–32.9)
MCHC RBC AUTO-ENTMCNC: 33.3 G/DL (ref 31.4–35)
MCV RBC AUTO: 91.1 FL (ref 82–102)
MONOCYTES # BLD: 0.4 K/UL (ref 0.1–1.3)
MONOCYTES NFR BLD: 9 % (ref 4–12)
NEUTS SEG # BLD: 2.2 K/UL (ref 1.7–8.2)
NEUTS SEG NFR BLD: 48 % (ref 43–78)
NRBC # BLD: 0 K/UL (ref 0–0.2)
PLATELET # BLD AUTO: 209 K/UL (ref 150–450)
PMV BLD AUTO: 9.2 FL (ref 9.4–12.3)
RBC # BLD AUTO: 5.63 M/UL (ref 4.23–5.6)
TSH W FREE THYROID IF ABNORMAL: 3.18 UIU/ML (ref 0.36–3.74)
WBC # BLD AUTO: 4.5 K/UL (ref 4.3–11.1)